# Patient Record
Sex: FEMALE | Race: WHITE | Employment: UNEMPLOYED | URBAN - METROPOLITAN AREA
[De-identification: names, ages, dates, MRNs, and addresses within clinical notes are randomized per-mention and may not be internally consistent; named-entity substitution may affect disease eponyms.]

---

## 2018-07-22 ENCOUNTER — APPOINTMENT (OUTPATIENT)
Dept: GENERAL RADIOLOGY | Age: 48
End: 2018-07-22
Attending: EMERGENCY MEDICINE
Payer: MEDICAID

## 2018-07-22 ENCOUNTER — HOSPITAL ENCOUNTER (EMERGENCY)
Age: 48
Discharge: HOME | End: 2018-07-22
Attending: EMERGENCY MEDICINE
Payer: MEDICAID

## 2018-07-22 VITALS
SYSTOLIC BLOOD PRESSURE: 147 MMHG | TEMPERATURE: 97.9 F | HEIGHT: 66 IN | WEIGHT: 293 LBS | RESPIRATION RATE: 12 BRPM | BODY MASS INDEX: 47.09 KG/M2 | OXYGEN SATURATION: 99 % | DIASTOLIC BLOOD PRESSURE: 97 MMHG | HEART RATE: 88 BPM

## 2018-07-22 DIAGNOSIS — S16.1XXA STRAIN OF NECK MUSCLE, INITIAL ENCOUNTER: Primary | ICD-10-CM
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
## 2018-07-22 PROCEDURE — 99282 EMERGENCY DEPT VISIT SF MDM: CPT

## 2018-07-22 NOTE — DISCHARGE INSTRUCTIONS
Neck Strain: Care Instructions  Your Care Instructions    You have strained the muscles and ligaments in your neck. A sudden, awkward movement can strain the neck. This often occurs with falls or car accidents or during certain sports. Everyday activities like working on a computer or sleeping can also cause neck strain if they force you to hold your neck in an awkward position for a long time. It is common for neck pain to get worse for a day or two after an injury, but it should start to feel better after that. You may have more pain and stiffness for several days before it gets better. This is expected. It may take a few weeks or longer for it to heal completely. Good home treatment can help you get better faster and avoid future neck problems. Follow-up care is a key part of your treatment and safety. Be sure to make and go to all appointments, and call your doctor if you are having problems. It's also a good idea to know your test results and keep a list of the medicines you take. How can you care for yourself at home? · If you were given a neck brace (cervical collar) to limit neck motion, wear it as instructed for as many days as your doctor tells you to. Do not wear it longer than you were told to. Wearing a brace for too long can make neck stiffness worse and weaken the neck muscles. · You can try using heat or ice to see if it helps. ¨ Try using a heating pad on a low or medium setting for 15 to 20 minutes every 2 to 3 hours. Try a warm shower in place of one session with the heating pad. You can also buy single-use heat wraps that last up to 8 hours. ¨ You can also try an ice pack for 10 to 15 minutes every 2 to 3 hours. · Take pain medicines exactly as directed. ¨ If the doctor gave you a prescription medicine for pain, take it as prescribed. ¨ If you are not taking a prescription pain medicine, ask your doctor if you can take an over-the-counter medicine.   · Gently rub the area to relieve pain and help with blood flow. Do not massage the area if it hurts to do so. · Do not do anything that makes the pain worse. Take it easy for a couple of days. You can do your usual activities if they do not hurt your neck or put it at risk for more stress or injury. · Try sleeping on a special neck pillow. Place it under your neck, not under your head. Placing a tightly rolled-up towel under your neck while you sleep will also work. If you use a neck pillow or rolled towel, do not use your regular pillow at the same time. · To prevent future neck pain, do exercises to stretch and strengthen your neck and back. Learn how to use good posture, safe lifting techniques, and proper body mechanics. When should you call for help? Call 911 anytime you think you may need emergency care. For example, call if:    · You are unable to move an arm or a leg at all.   [facility] your doctor now or seek immediate medical care if:    · You have new or worse symptoms in your arms, legs, chest, belly, or buttocks. Symptoms may include:  ¨ Numbness or tingling. ¨ Weakness. ¨ Pain.     · You lose bladder or bowel control.    Watch closely for changes in your health, and be sure to contact your doctor if:    · You are not getting better as expected. Where can you learn more? Go to http://alli-jacky.info/. Enter M253 in the search box to learn more about \"Neck Strain: Care Instructions. \"  Current as of: November 29, 2017  Content Version: 11.7  © 6287-1029 [employer]. Care instructions adapted under license by [employer] (which disclaims liability or warranty for this information). If you have questions about a medical condition or this instruction, always ask your healthcare professional. [place] [address] any warranty or liability for your use of this information.          Whiplash: Care Instructions  Your Care Instructions  Whiplash occurs when your head is suddenly forced forward and then snapped backward, as might happen in a car accident or sports injury. This can cause pain and stiffness in your neck. Your head, chest, shoulders, and arms also may hurt. Most whiplash gets better with home care. Your doctor may advise you to take medicine to relieve pain or relax your muscles. He or she may suggest exercise and physical therapy to increase flexibility and relieve pain. You can try wearing a neck (cervical) collar to support your neck. For a while you probably will need to avoid lifting and other activities that can strain the neck. Follow-up care is a key part of your treatment and safety. Be sure to make and go to all appointments, and call your doctor if you are having problems. It's also a good idea to know your test results and keep a list of the medicines you take. How can you care for yourself at home? · Take pain medicines exactly as directed. ¨ If the doctor gave you a prescription medicine for pain, take it as prescribed. ¨ If you are not taking a prescription pain medicine, ask your doctor if you can take an over-the-counter medicine. ¨ Do not take two or more pain medicines at the same time unless the doctor told you to. Many pain medicines have acetaminophen, which is Tylenol. Too much acetaminophen (Tylenol) can be harmful. · You can try using a soft foam collar to support your neck for short periods of time. You can buy one at most [employer]. Do not wear the collar more than 2 or 3 days unless your doctor tells you to. · You can try using heat and ice to see if it helps. ¨ Try using a heating pad on a low or medium setting for 15 to 20 minutes every 2 to 3 hours. Try a warm shower in place of one session with the heating pad. You can also buy single-use heat wraps that last up to 8 hours. ¨ You can also try an ice pack for 10 to 15 minutes every 2 to 3 hours. · Do not do anything that makes the pain worse. Take it easy for a couple of days.  You can do your usual activities if they do not hurt your neck or put it at risk for more stress or injury. Avoid lifting, sports, or other activities that might strain your neck. · Try sleeping on a special neck pillow. Place it under your neck, not under your head. Placing a tightly rolled-up towel under your neck while you sleep will also work. If you use a neck pillow or rolled towel, do not use your regular pillow at the same time. · Once your neck pain is gone, do exercises to stretch your neck and back and make them stronger. Your doctor or physical therapist can tell you which exercises are best.  When should you call for help? Call 911 anytime you think you may need emergency care. For example, call if:    · You are unable to move an arm or a leg at all.   [facility] your doctor now or seek immediate medical care if:    · You have new or worse symptoms in your arms, legs, chest, belly, or buttocks. Symptoms may include:  ¨ Numbness or tingling. ¨ Weakness. ¨ Pain.     · You lose bladder or bowel control.    Watch closely for changes in your health, and be sure to contact your doctor if:    · You are not getting better as expected. Where can you learn more? Go to http://alli-jacky.info/. Enter I988 in the search box to learn more about \"Whiplash: Care Instructions. \"  Current as of: November 29, 2017  Content Version: 11.7  © 0515-5482 [employer]. Care instructions adapted under license by [employer] (which disclaims liability or warranty for this information). If you have questions about a medical condition or this instruction, always ask your healthcare professional. [place] [address] any warranty or liability for your use of this information.
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
## 2018-08-20 ENCOUNTER — HOSPITAL ENCOUNTER (EMERGENCY)
Age: 48
Discharge: HOME | End: 2018-08-20
Attending: EMERGENCY MEDICINE
Payer: MEDICAID

## 2018-08-20 ENCOUNTER — APPOINTMENT (OUTPATIENT)
Dept: CT IMAGING | Age: 48
End: 2018-08-20
Attending: EMERGENCY MEDICINE
Payer: MEDICAID

## 2018-08-20 VITALS
RESPIRATION RATE: 14 BRPM | WEIGHT: 293 LBS | DIASTOLIC BLOOD PRESSURE: 76 MMHG | HEIGHT: 66 IN | OXYGEN SATURATION: 97 % | BODY MASS INDEX: 47.09 KG/M2 | TEMPERATURE: 98.4 F | SYSTOLIC BLOOD PRESSURE: 118 MMHG | HEART RATE: 72 BPM

## 2018-08-20 DIAGNOSIS — M62.838 CERVICAL PARASPINAL MUSCLE SPASM: Primary | ICD-10-CM

## 2018-08-20 DIAGNOSIS — G62.9 POLYNEUROPATHY: ICD-10-CM

## 2018-08-20 LAB
ANION GAP SERPL CALC-SCNC: 7 MMOL/L (ref 5–15)
BUN SERPL-MCNC: 13 MG/DL (ref 6–20)
BUN/CREAT SERPL: 19 (ref 12–20)
CALCIUM SERPL-MCNC: 8.7 MG/DL (ref 8.5–10.1)
CHLORIDE SERPL-SCNC: 106 MMOL/L (ref 97–108)
CO2 SERPL-SCNC: 24 MMOL/L (ref 21–32)
CREAT SERPL-MCNC: 0.67 MG/DL (ref 0.55–1.02)
ERYTHROCYTE [DISTWIDTH] IN BLOOD BY AUTOMATED COUNT: 13.4 % (ref 11.5–14.5)
GLUCOSE SERPL-MCNC: 96 MG/DL (ref 65–100)
HCT VFR BLD AUTO: 41.1 % (ref 35–47)
HGB BLD-MCNC: 13.4 G/DL (ref 11.5–16)
MAGNESIUM SERPL-MCNC: 1.7 MG/DL (ref 1.6–2.4)
MCH RBC QN AUTO: 29.5 PG (ref 26–34)
MCHC RBC AUTO-ENTMCNC: 32.6 G/DL (ref 30–36.5)
MCV RBC AUTO: 90.3 FL (ref 80–99)
NRBC # BLD: 0 K/UL (ref 0–0.01)
NRBC BLD-RTO: 0 PER 100 WBC
PLATELET # BLD AUTO: 397 K/UL (ref 150–400)
PMV BLD AUTO: 9.6 FL (ref 8.9–12.9)
POTASSIUM SERPL-SCNC: 4 MMOL/L (ref 3.5–5.1)
RBC # BLD AUTO: 4.55 M/UL (ref 3.8–5.2)
SODIUM SERPL-SCNC: 137 MMOL/L (ref 136–145)
WBC # BLD AUTO: 10.6 K/UL (ref 3.6–11)

## 2018-08-20 PROCEDURE — 85027 COMPLETE CBC AUTOMATED: CPT | Performed by: EMERGENCY MEDICINE

## 2018-08-20 PROCEDURE — 83735 ASSAY OF MAGNESIUM: CPT | Performed by: EMERGENCY MEDICINE

## 2018-08-20 PROCEDURE — 70450 CT HEAD/BRAIN W/O DYE: CPT

## 2018-08-20 PROCEDURE — 36415 COLL VENOUS BLD VENIPUNCTURE: CPT | Performed by: EMERGENCY MEDICINE

## 2018-08-20 PROCEDURE — 99283 EMERGENCY DEPT VISIT LOW MDM: CPT

## 2018-08-20 PROCEDURE — 72125 CT NECK SPINE W/O DYE: CPT

## 2018-08-20 PROCEDURE — 80048 BASIC METABOLIC PNL TOTAL CA: CPT | Performed by: EMERGENCY MEDICINE

## 2018-08-20 RX ORDER — PREDNISONE 10 MG/1
TABLET ORAL
Qty: 21 TAB | Refills: 0 | Status: SHIPPED | OUTPATIENT
Start: 2018-08-20

## 2018-08-20 NOTE — DISCHARGE INSTRUCTIONS
Neck Spasm: Care Instructions  Your Care Instructions  A neck spasm is sudden tightness and pain in your neck muscles. A spasm may be caused by some activities or repeated movements. For example, you may be more likely to have a neck spasm if you slouch, paint a ceiling, work at a computer, or sleep with your neck twisted. But the cause isn't always clear. Home treatment includes using heat or ice, taking over-the-counter (OTC) pain medicines, and avoiding activities that may lead to neck pain. Gentle stretching, or treatments such as massage or manipulation, may also help ease a neck spasm. For a neck spasm that doesn't get better with home care, your doctor may prescribe medicine. He or she may also suggest exercise or physical therapy to help strengthen or relax your neck muscles. Follow-up care is a key part of your treatment and safety. Be sure to make and go to all appointments, and call your doctor if you are having problems. It's also a good idea to know your test results and keep a list of the medicines you take. How can you care for yourself at home? · To relieve pain, use heat or ice (whichever feels better) on the affected area. ¨ Put a warm water bottle, a heating pad set on low, or a warm cloth on your neck. Put a thin cloth between the heating pad and your skin. Do not go to sleep with a heating pad on your skin. ¨ Try ice or a cold pack on the area for 10 to 20 minutes at a time. Put a thin cloth between the ice and your skin. · Ask your doctor if you can take acetaminophen (such as Tylenol) or nonsteroidal anti-inflammatory drugs, such as ibuprofen or naproxen. Your doctor can prescribe stronger medicines if needed. Be safe with medicines. Read and follow all instructions on the label. · Stretch your muscles every day, especially before and after exercise and at bedtime. Regular stretching can help relax your muscles.   · Try to find a pillow and a position in bed that help improve your night's rest.  · Try to stay active. It's best to start activity slowly. If an exercise makes your painworse, stop doing it. When should you call for help? Call 911 anytime you think you may need emergency care. For example, call if:    · You are unable to move an arm or a leg at all.   [facility] your doctor now or seek immediate medical care if:    · You have new or worse symptoms in your arms, legs, belly, or buttocks. Symptoms may include:  ¨ Numbness or tingling. ¨ Weakness. ¨ Pain.     · You lose bladder or bowel control.    Watch closely for changes in your health, and be sure to contact your doctor if:    · You do not get better as expected. Where can you learn more? Go to http://alli-jacky.info/. Enter F916 in the search box to learn more about \"Neck Spasm: Care Instructions. \"  Current as of: November 29, 2017  Content Version: 11.7  © 5257-9753 [employer]. Care instructions adapted under license by [employer] (which disclaims liability or warranty for this information). If you have questions about a medical condition or this instruction, always ask your healthcare professional. [place] [address] any warranty or liability for your use of this information.
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
## 2018-08-30 ENCOUNTER — HOSPITAL ENCOUNTER (OUTPATIENT)
Dept: GENERAL RADIOLOGY | Age: 48
Discharge: HOME | End: 2018-08-30
Attending: ORTHOPAEDIC SURGERY

## 2018-08-30 ENCOUNTER — HOSPITAL ENCOUNTER (OUTPATIENT)
Dept: CT IMAGING | Age: 48
Discharge: HOME | End: 2018-08-30
Attending: ORTHOPAEDIC SURGERY

## 2018-08-30 DIAGNOSIS — M54.12 CERVICAL RADICULOPATHY: ICD-10-CM

## 2018-08-30 RX ORDER — LIDOCAINE HYDROCHLORIDE 10 MG/ML
10 INJECTION INFILTRATION; PERINEURAL
Status: ACTIVE | OUTPATIENT
Start: 2018-08-30 | End: 2018-08-30

## 2018-09-28 ENCOUNTER — HOSPITAL ENCOUNTER (OUTPATIENT)
Dept: GENERAL RADIOLOGY | Age: 48
Discharge: HOME | End: 2018-09-28
Attending: ORTHOPAEDIC SURGERY
Payer: MEDICAID

## 2018-09-28 ENCOUNTER — APPOINTMENT (OUTPATIENT)
Dept: CT IMAGING | Age: 48
End: 2018-09-28
Attending: ORTHOPAEDIC SURGERY
Payer: MEDICAID

## 2018-09-28 ENCOUNTER — HOSPITAL ENCOUNTER (OUTPATIENT)
Dept: CT IMAGING | Age: 48
Discharge: HOME | End: 2018-09-28
Attending: ORTHOPAEDIC SURGERY
Payer: MEDICAID

## 2018-09-28 VITALS
RESPIRATION RATE: 18 BRPM | HEART RATE: 78 BPM | DIASTOLIC BLOOD PRESSURE: 70 MMHG | TEMPERATURE: 98.2 F | SYSTOLIC BLOOD PRESSURE: 132 MMHG

## 2018-09-28 DIAGNOSIS — M54.12 CERVICAL RADICULOPATHY: ICD-10-CM

## 2018-09-28 DIAGNOSIS — M54.14 THORACIC RADICULOPATHY: ICD-10-CM

## 2018-09-28 PROCEDURE — 72129 CT CHEST SPINE W/DYE: CPT

## 2018-09-28 PROCEDURE — 72126 CT NECK SPINE W/DYE: CPT

## 2018-09-28 PROCEDURE — 74011636320 HC RX REV CODE- 636/320: Performed by: RADIOLOGY

## 2018-09-28 PROCEDURE — 74011250636 HC RX REV CODE- 250/636: Performed by: RADIOLOGY

## 2018-09-28 PROCEDURE — 62305 MYELOGRAPHY LUMBAR INJECTION: CPT

## 2018-09-28 RX ORDER — LIDOCAINE HYDROCHLORIDE 10 MG/ML
2 INJECTION, SOLUTION EPIDURAL; INFILTRATION; INTRACAUDAL; PERINEURAL
Status: COMPLETED | OUTPATIENT
Start: 2018-09-28 | End: 2018-09-28

## 2018-09-28 RX ADMIN — LIDOCAINE HYDROCHLORIDE 2 ML: 10 INJECTION, SOLUTION EPIDURAL; INFILTRATION; INTRACAUDAL; PERINEURAL at 12:00

## 2018-09-28 RX ADMIN — IOHEXOL 12 ML: 300 INJECTION, SOLUTION INTRAVENOUS at 09:00

## 2018-09-28 NOTE — ROUTINE PROCESS
Arrived post procedure A/O x 3 c/o shoulder pain from positioning in CT scanner. Sitting up in bed eating lana crackers and drinking a ginger ale.

## 2018-09-28 NOTE — IP AVS SNAPSHOT
Summary of Care Report The Summary of Care report has been created to help improve care coordination. Users with access to [employer] or [address] (Web-based application) may access additional patient information including the Discharge Summary. If you are not currently a [address] user and need more information, please call the number listed below in the [place] section and ask to be connected with Medical Records. Facility Information Name Address Phone [address] [address] [phone] Patient Information Patient Name Sex  Bhavana Rangel ([id number]) Female 1970 Discharge Information Admitting Provider Service Area Unit  
 (none) [address] [place] [address] Radiology / [phone] Discharge Provider Discharge Date/Time Discharge Disposition Destination (none) (none) (none) (none) Patient Language Language ENGLISH [13] Hospital Problems as of 2018  Reviewed: 2013 11:14 AM by Moose [place] None Non-Hospital Problems as of 2018  Reviewed: 2013 11:14 AM by Moose [place] Class Noted - Resolved Last Modified Active Problems Hypothyroid  2009 - Present 2009 by Deshawn Keith MD  
  Entered by Deshawn Keith MD  
  Cardiomyopathy [facility])  2009 - Present 2011 by Dolph Cooks, MD  
  Entered by Deshawn Keith MD  
  Overview Addendum 2011  1:53 PM by Dolph Cooks, MD  
   Diagnosed after delivery of her son, had toxemia, EF 15 % PERIPARTUM CARDIOMYOPATHY Cardiac Evaluation Includes: 
a. Echo (12/15/6): Dilated LV. EF 10-15% with severe GHK. Dilated LA. Mild to moderate MR. 
b. Echo (): EF 10-15% with moderately dilated LV. Moderately dilated LA. c. Echo (3/25/9): EF 10-15% with severe GHK. Mildly dilated LV. Pseudo. Mildly dilated LA. Mild MR/TR. PASP 21. D. Patient says cardiac cath was done in June 2010 at [facility] showing normal coronaries; LVEF on echo at that time was 15% Morbid obesity ([address]  Unknown - Present 10/21/2010 by Gypsy Palumbo  
  Entered by Gypsy Palumbo  
  Other and unspecified hyperlipidemia  Unknown - Present 10/21/2010 by Gypsy Palumbo  
  Entered by Gypsy Palumbo  
  Psychiatric disturbance  Unknown - Present 1/12/2011 by Kimberly Swain MD  
  Entered by Kimberly Swain MD  
  Overview Signed 1/12/2011  1:04 PM by Kimberly Swain MD  
   In 4/2009 she had unusual behavior noted during her clinic visit for which an psych evaluation recommended (AICD declined then); In 2/10 visit with Dr. Jayla Ontiveros patient had paraoid behavior and she declined Psych eval (AICD again deferred) You are allergic to the following No active allergies Current Discharge Medication List  
  
ASK your doctor about these medications Dose & Instructions Dispensing Information Comments  
 bumetanide 0.5 mg tablet Commonly known as:  [place] TAKE 2 TABLETS BY MOUTH EVERY DAY AS NEEDED (TAKE ONCE DAILY AS NEEDED FOR WORSENING SWELLING) Quantity:  30 Tab Refills:  0  
 NO MORE REFILLS AFTER THIS. SHE HAS NOT BEEN SEEN IN A YEAR  
  
 hydrALAZINE 25 mg tablet Commonly known as:  APRESOLINE Dose:  25 mg Take 1 Tab by mouth two (2) times a day. Quantity:  60 Tab Refills:  11 LEVOTHROID 112 mcg tablet Generic drug:  levothyroxine Take  by mouth daily (before breakfast). Refills:  0  
   
 metFORMIN 1,000 mg tablet Commonly known as:  GLUCOPHAGE Dose:  1000 mg Take 1,000 mg by mouth two (2) times daily (with meals). Refills:  0  
   
 metoprolol succinate 50 mg XL tablet Commonly known as:  TOPROL-XL Dose:  50 mg Take 1 Tab by mouth daily.  Take half a tablet daily for one week and if stable, increase to one tablet (50 mg) once daily thereafter. Quantity:  30 Tab Refills:  11  
   
 predniSONE 10 mg dose pack Commonly known as:  STERAPRED DS Use as directed. Quantity:  21 Tab Refills:  0 PRENATAL VIT #7-IRON-FA-DSS PO Take  by mouth. Refills:  0 Follow-up Information None Discharge Instructions [facility] Radiology Department [phone] Radiologist: Dr Leslie Elias Boomhuye July Date: 09/28/2018 Myelogram Discharge Instructions Go home and rest and restrict your activity the next 24 - 48 hours. Rest in a reclined position, keep your head elevated to minimize post procedure complications. Resume your previous diet and medications. Increase your fluid intake over the next 1-2 days to help the kidneys flush out the dye that was injected during your procedure. You may take Tylenol, as directed on the label, for pain or discomfort. Avoid Ibuprofen (Advil, Motrin etc.) and Aspirin today as they may increase your risk of bleeding. Avoid heavy lifting (nothing greater than 5 pounds),  excessive bending, pushing or pulling movements for 2 days to minimize your risk of post procedure headache. You may shower in 24 hours. Do not soak or swim until the site has healed completely. Results will be sent to your physician as soon as they become available. Follow up with your physician as previously discussed and be sure to bring the CD to that was provided for you today to your appointment. Chart Review Routing History Recipient Method Report Sent By Isaiah Gatica MD  
Phone: [phone] In Basket Note Review Birdia Ganser, MD [[id number]] 5/11/2011 12:15 AM 05/11/2011 Aldair Gatica MD  
Phone: [phone] In Basket Note Review Birdia Ganser, MD [[id number]] 5/20/2011 11:50 AM 05/20/2011 Dr. Chel Rangel Fax: [phone] Fax Provider Comm Report Adama Nam MD [[id number]] 5/20/2011 11:52 AM 05/20/2011 Kunal Mata MD  
Phone: [phone] In Basket [facility] amb office visit enc summ w/hx Santi Mcintyre [[id number]] 8/23/2012  3:53 PM 08/23/2012

## 2018-09-28 NOTE — DISCHARGE INSTRUCTIONS
[address]  Radiology Department  [phone]    Radiologist: Dr Pratima Blake. Avel Morning    Date: 09/28/2018      Myelogram Discharge Instructions    Go home and rest and restrict your activity the next 24 - 48 hours. Rest in a reclined position, keep your head elevated to minimize post procedure complications. Resume your previous diet and medications. Increase your fluid intake over the next 1-2 days to help the kidneys flush out the dye that was injected during your procedure. You may take Tylenol, as directed on the label, for pain or discomfort. Avoid Ibuprofen (Advil, Motrin etc.) and Aspirin today as they may increase your risk of bleeding. Avoid heavy lifting (nothing greater than 5 pounds),  excessive bending, pushing or pulling movements for 2 days to minimize your risk of post procedure headache. You may shower in 24 hours. Do not soak or swim until the site has healed completely. Results will be sent to your physician as soon as they become available. Follow up with your physician as previously discussed and be sure to bring the CD to that was provided for you today to your appointment.

## 2019-12-17 ENCOUNTER — HOSPITAL ENCOUNTER (OUTPATIENT)
Dept: CT IMAGING | Age: 49
Discharge: HOME | End: 2019-12-17
Attending: PODIATRIST
Payer: SELF-PAY

## 2019-12-17 DIAGNOSIS — S90.851A FOREIGN BODY IN RIGHT FOOT: ICD-10-CM

## 2019-12-17 DIAGNOSIS — M79.671 RIGHT FOOT PAIN: ICD-10-CM

## 2019-12-17 DIAGNOSIS — S91.331A: ICD-10-CM

## 2019-12-17 PROCEDURE — 73700 CT LOWER EXTREMITY W/O DYE: CPT

## 2020-07-21 ENCOUNTER — HOSPITAL ENCOUNTER (OUTPATIENT)
Dept: GENERAL RADIOLOGY | Age: 50
Discharge: HOME | End: 2020-07-21
Attending: ORTHOPAEDIC SURGERY

## 2020-07-27 NOTE — PROGRESS NOTES
Do you have a personal history of COVID-19 within the past 28 days? No  If Yes, What was the method of testing: clinical assumption or test result? Have you had close contact with a known to be positive COVID-19 patient within the past 14 days? No    Are you a healthcare worker or ? No  If Yes, have you been exposed to COVID-19 without proper PPE? Do you live in a SNF, adult home or other institutional setting? [place]   If Yes, have they experienced a flood of COVID-19 positive patients?     In the past 2-14 days have you had any of the following symptoms    Cough No   New onset Shortness of breath or difficulty breathing  No    Or at least two of these symptoms:   Fever greater than 100  No   Chills No   Repeated shaking with chills   Muscle pain No   Headache No   Sore throat  No   New loss of taste or smell  No   New onset diarrhea No    Travel negative

## 2020-07-28 ENCOUNTER — HOSPITAL ENCOUNTER (OUTPATIENT)
Dept: CT IMAGING | Age: 50
Discharge: HOME | End: 2020-07-28
Attending: ORTHOPAEDIC SURGERY
Payer: MEDICAID

## 2020-07-28 ENCOUNTER — HOSPITAL ENCOUNTER (OUTPATIENT)
Dept: GENERAL RADIOLOGY | Age: 50
Discharge: HOME | End: 2020-07-28
Attending: ORTHOPAEDIC SURGERY
Payer: MEDICAID

## 2020-07-28 VITALS
HEART RATE: 75 BPM | OXYGEN SATURATION: 97 % | RESPIRATION RATE: 20 BRPM | DIASTOLIC BLOOD PRESSURE: 76 MMHG | SYSTOLIC BLOOD PRESSURE: 135 MMHG

## 2020-07-28 DIAGNOSIS — M54.14 THORACIC RADICULOPATHY: ICD-10-CM

## 2020-07-28 DIAGNOSIS — M54.12 CERVICAL RADICULOPATHY: ICD-10-CM

## 2020-07-28 PROCEDURE — 62305 MYELOGRAPHY LUMBAR INJECTION: CPT

## 2020-07-28 PROCEDURE — 74011636320 HC RX REV CODE- 636/320: Performed by: RADIOLOGY

## 2020-07-28 PROCEDURE — 72126 CT NECK SPINE W/DYE: CPT

## 2020-07-28 PROCEDURE — 72129 CT CHEST SPINE W/DYE: CPT

## 2020-07-28 RX ADMIN — IOHEXOL 50 ML: 240 INJECTION, SOLUTION INTRATHECAL; INTRAVASCULAR; INTRAVENOUS; ORAL at 12:25

## 2020-07-28 NOTE — PROGRESS NOTES
Pt to angio recovery post mylogram.  Pt is without comlant, vss and wnl, pt verbalized understanding of d/c and follow up instructions.

## 2023-12-22 ENCOUNTER — APPOINTMENT (OUTPATIENT)
Facility: HOSPITAL | Age: 53
End: 2023-12-22
Payer: MEDICAID

## 2023-12-22 ENCOUNTER — HOSPITAL ENCOUNTER (EMERGENCY)
Facility: HOSPITAL | Age: 53
Discharge: HOME | End: 2023-12-22
Attending: STUDENT IN AN ORGANIZED HEALTH CARE EDUCATION/TRAINING PROGRAM
Payer: MEDICAID

## 2023-12-22 VITALS
OXYGEN SATURATION: 95 % | BODY MASS INDEX: 47.09 KG/M2 | RESPIRATION RATE: 16 BRPM | WEIGHT: 293 LBS | HEIGHT: 66 IN | TEMPERATURE: 98.2 F | DIASTOLIC BLOOD PRESSURE: 83 MMHG | SYSTOLIC BLOOD PRESSURE: 119 MMHG | HEART RATE: 86 BPM

## 2023-12-22 DIAGNOSIS — S06.0X0A CONCUSSION WITHOUT LOSS OF CONSCIOUSNESS, INITIAL ENCOUNTER: ICD-10-CM

## 2023-12-22 DIAGNOSIS — S63.501A SPRAIN OF RIGHT WRIST, INITIAL ENCOUNTER: Primary | ICD-10-CM

## 2023-12-22 PROCEDURE — 99284 EMERGENCY DEPT VISIT MOD MDM: CPT

## 2023-12-22 PROCEDURE — 70450 CT HEAD/BRAIN W/O DYE: CPT

## 2023-12-22 PROCEDURE — 73090 X-RAY EXAM OF FOREARM: CPT

## 2023-12-22 PROCEDURE — 73110 X-RAY EXAM OF WRIST: CPT

## 2023-12-22 PROCEDURE — 72125 CT NECK SPINE W/O DYE: CPT

## 2023-12-22 RX ORDER — ONDANSETRON 4 MG/1
4 TABLET, ORALLY DISINTEGRATING ORAL 3 TIMES DAILY PRN
Qty: 15 TABLET | Refills: 0 | Status: SHIPPED | OUTPATIENT
Start: 2023-12-22 | End: 2023-12-27

## 2023-12-22 NOTE — ED NOTES
Ischarged by provider. Instructions and results reviewed by provider. Leaves ambulatory in no acute distress.

## 2023-12-22 NOTE — ED TRIAGE NOTES
Patient reports on Sunday she was pulled down by her dog causing her to fall off of one step and she hit the anterior left side of her head on a concrete drainage ditch. Denies LOC. Complains of headache, blurred vision, confusion, photophobia, dizziness, nausea and photophobia since Monday stating \"I am having concussion symptoms\". Reports today she started with pins and needles sensation to her face and bilateral arms. Also with posterior neck pain with right wrist pain stating \"I feel stiffness and pulling in my elbows\".

## 2025-03-03 LAB
ESTIMATED AVERAGE GLUCOSE: NORMAL
HBA1C MFR BLD: 6.9 %

## 2025-07-24 SDOH — HEALTH STABILITY: PHYSICAL HEALTH: ON AVERAGE, HOW MANY MINUTES DO YOU ENGAGE IN EXERCISE AT THIS LEVEL?: 0 MIN

## 2025-07-24 SDOH — HEALTH STABILITY: PHYSICAL HEALTH: ON AVERAGE, HOW MANY DAYS PER WEEK DO YOU ENGAGE IN MODERATE TO STRENUOUS EXERCISE (LIKE A BRISK WALK)?: 0 DAYS

## 2025-08-05 ENCOUNTER — OFFICE VISIT (OUTPATIENT)
Facility: CLINIC | Age: 55
End: 2025-08-05

## 2025-08-05 VITALS
RESPIRATION RATE: 20 BRPM | WEIGHT: 293 LBS | DIASTOLIC BLOOD PRESSURE: 88 MMHG | TEMPERATURE: 98.4 F | SYSTOLIC BLOOD PRESSURE: 138 MMHG | HEART RATE: 72 BPM | HEIGHT: 66 IN | OXYGEN SATURATION: 98 % | BODY MASS INDEX: 47.09 KG/M2

## 2025-08-05 DIAGNOSIS — Z11.4 ENCOUNTER FOR SCREENING FOR HIV: ICD-10-CM

## 2025-08-05 DIAGNOSIS — Z76.89 ENCOUNTER TO ESTABLISH CARE: Primary | ICD-10-CM

## 2025-08-05 DIAGNOSIS — Z78.0 MENOPAUSE: ICD-10-CM

## 2025-08-05 DIAGNOSIS — I10 BENIGN ESSENTIAL HYPERTENSION: ICD-10-CM

## 2025-08-05 DIAGNOSIS — Z13.1 DIABETES MELLITUS SCREENING: ICD-10-CM

## 2025-08-05 DIAGNOSIS — E03.8 OTHER SPECIFIED HYPOTHYROIDISM: ICD-10-CM

## 2025-08-05 DIAGNOSIS — Z13.220 LIPID SCREENING: ICD-10-CM

## 2025-08-05 DIAGNOSIS — E66.9 EXTREME OBESITY: ICD-10-CM

## 2025-08-05 DIAGNOSIS — Z11.59 ENCOUNTER FOR HEPATITIS C SCREENING TEST FOR LOW RISK PATIENT: ICD-10-CM

## 2025-08-05 DIAGNOSIS — Z98.84 S/P LAPAROSCOPIC SLEEVE GASTRECTOMY: ICD-10-CM

## 2025-08-05 DIAGNOSIS — M16.11 OSTEOARTHRITIS OF RIGHT HIP, UNSPECIFIED OSTEOARTHRITIS TYPE: ICD-10-CM

## 2025-08-05 DIAGNOSIS — F43.10 PTSD (POST-TRAUMATIC STRESS DISORDER): ICD-10-CM

## 2025-08-05 DIAGNOSIS — E11.9 TYPE 2 DIABETES MELLITUS WITHOUT COMPLICATION, WITHOUT LONG-TERM CURRENT USE OF INSULIN (HCC): ICD-10-CM

## 2025-08-05 DIAGNOSIS — E78.5 DYSLIPIDEMIA: ICD-10-CM

## 2025-08-05 DIAGNOSIS — G89.21 CHRONIC PAIN DUE TO TRAUMA: ICD-10-CM

## 2025-08-05 RX ORDER — BUTALBITAL, ACETAMINOPHEN AND CAFFEINE 50; 325; 40 MG/1; MG/1; MG/1
TABLET ORAL
COMMUNITY

## 2025-08-05 RX ORDER — LIDOCAINE 50 MG/G
PATCH TOPICAL
COMMUNITY

## 2025-08-05 RX ORDER — PROBENECID 500 MG/1
500 TABLET, FILM COATED ORAL 2 TIMES DAILY
COMMUNITY
Start: 2025-04-07

## 2025-08-05 RX ORDER — ESTRADIOL 10 UG/1
TABLET VAGINAL
COMMUNITY
Start: 2025-07-29

## 2025-08-05 RX ORDER — HYDROXYZINE HYDROCHLORIDE 25 MG/1
TABLET, FILM COATED ORAL
COMMUNITY

## 2025-08-05 RX ORDER — METOPROLOL SUCCINATE 100 MG/1
100 TABLET, EXTENDED RELEASE ORAL DAILY
COMMUNITY
Start: 2025-05-30

## 2025-08-05 RX ORDER — DIAZEPAM 2 MG/1
2 TABLET ORAL DAILY
COMMUNITY
Start: 2025-02-05

## 2025-08-05 RX ORDER — LISINOPRIL 10 MG/1
1 TABLET ORAL DAILY
COMMUNITY

## 2025-08-05 RX ORDER — SPIRONOLACTONE 25 MG/1
12.5 TABLET ORAL DAILY
COMMUNITY
Start: 2025-07-14 | End: 2026-07-14

## 2025-08-05 RX ORDER — LEVOTHYROXINE SODIUM 137 UG/1
137 TABLET ORAL
COMMUNITY
Start: 2025-06-17

## 2025-08-05 RX ORDER — ESTRADIOL 0.1 MG/G
CREAM VAGINAL
COMMUNITY
Start: 2025-08-03

## 2025-08-05 RX ORDER — BUMETANIDE 2 MG/1
2 TABLET ORAL 2 TIMES DAILY
COMMUNITY
Start: 2025-05-30

## 2025-08-05 RX ORDER — CYCLOBENZAPRINE HCL 10 MG
10 TABLET ORAL 3 TIMES DAILY
COMMUNITY

## 2025-08-05 SDOH — ECONOMIC STABILITY: FOOD INSECURITY: WITHIN THE PAST 12 MONTHS, YOU WORRIED THAT YOUR FOOD WOULD RUN OUT BEFORE YOU GOT MONEY TO BUY MORE.: NEVER TRUE

## 2025-08-05 SDOH — ECONOMIC STABILITY: FOOD INSECURITY: WITHIN THE PAST 12 MONTHS, THE FOOD YOU BOUGHT JUST DIDN'T LAST AND YOU DIDN'T HAVE MONEY TO GET MORE.: NEVER TRUE

## 2025-08-06 PROBLEM — M16.11 OSTEOARTHRITIS OF RIGHT HIP: Status: ACTIVE | Noted: 2025-08-06

## 2025-08-06 PROBLEM — I10 BENIGN ESSENTIAL HYPERTENSION: Status: ACTIVE | Noted: 2025-08-06

## 2025-08-06 PROBLEM — N39.0 CHRONIC URINARY TRACT INFECTION: Status: ACTIVE | Noted: 2022-12-20

## 2025-08-06 PROBLEM — Z78.0 MENOPAUSE: Status: ACTIVE | Noted: 2020-12-06

## 2025-08-06 PROBLEM — E78.5 DYSLIPIDEMIA: Status: ACTIVE | Noted: 2025-08-06

## 2025-08-06 PROBLEM — F43.10 PTSD (POST-TRAUMATIC STRESS DISORDER): Status: ACTIVE | Noted: 2025-08-06

## 2025-08-06 PROBLEM — G89.21 CHRONIC PAIN DUE TO TRAUMA: Status: ACTIVE | Noted: 2025-08-06

## 2025-08-06 PROBLEM — Z98.84 S/P LAPAROSCOPIC SLEEVE GASTRECTOMY: Status: ACTIVE | Noted: 2025-08-06

## 2025-08-06 PROBLEM — E11.9 TYPE 2 DIABETES MELLITUS WITHOUT COMPLICATIONS (HCC): Status: ACTIVE | Noted: 2025-08-06

## 2025-08-06 PROBLEM — D64.9 ANEMIA: Status: ACTIVE | Noted: 2019-05-09

## 2025-08-07 ENCOUNTER — RESULTS FOLLOW-UP (OUTPATIENT)
Facility: CLINIC | Age: 55
End: 2025-08-07

## 2025-08-07 LAB
CHOLEST SERPL-MCNC: 238 MG/DL (ref 100–199)
EST. AVERAGE GLUCOSE BLD GHB EST-MCNC: 151 MG/DL
HBA1C MFR BLD: 6.9 % (ref 4.8–5.6)
HCV IGG SERPL QL IA: NON REACTIVE
HDLC SERPL-MCNC: 41 MG/DL
HIV 1+2 AB+HIV1 P24 AG SERPL QL IA: NON REACTIVE
LDLC SERPL CALC-MCNC: 151 MG/DL (ref 0–99)
TRIGL SERPL-MCNC: 252 MG/DL (ref 0–149)
TSH SERPL DL<=0.005 MIU/L-ACNC: 1.31 UIU/ML (ref 0.45–4.5)
VLDLC SERPL CALC-MCNC: 46 MG/DL (ref 5–40)

## 2025-08-10 DIAGNOSIS — R30.0 DYSURIA: Primary | ICD-10-CM

## 2025-08-11 ENCOUNTER — TELEPHONE (OUTPATIENT)
Facility: CLINIC | Age: 55
End: 2025-08-11

## 2025-08-11 DIAGNOSIS — E78.00 HYPERCHOLESTEROLEMIA: Primary | ICD-10-CM

## 2025-08-11 RX ORDER — ATORVASTATIN CALCIUM 40 MG/1
40 TABLET, FILM COATED ORAL DAILY
Qty: 90 TABLET | Refills: 0 | Status: SHIPPED | OUTPATIENT
Start: 2025-08-11